# Patient Record
Sex: FEMALE | Race: WHITE | NOT HISPANIC OR LATINO | URBAN - METROPOLITAN AREA
[De-identification: names, ages, dates, MRNs, and addresses within clinical notes are randomized per-mention and may not be internally consistent; named-entity substitution may affect disease eponyms.]

---

## 2019-01-04 ENCOUNTER — OFFICE VISIT - HEALTHEAST (OUTPATIENT)
Dept: FAMILY MEDICINE | Facility: CLINIC | Age: 64
End: 2019-01-04

## 2019-01-04 DIAGNOSIS — M25.511 ACUTE PAIN OF RIGHT SHOULDER: ICD-10-CM

## 2019-01-08 ENCOUNTER — COMMUNICATION - HEALTHEAST (OUTPATIENT)
Dept: ADMINISTRATIVE | Facility: CLINIC | Age: 64
End: 2019-01-08

## 2019-04-03 ENCOUNTER — RECORDS - HEALTHEAST (OUTPATIENT)
Dept: MAMMOGRAPHY | Facility: CLINIC | Age: 64
End: 2019-04-03

## 2019-04-03 DIAGNOSIS — Z12.31 ENCOUNTER FOR SCREENING MAMMOGRAM FOR MALIGNANT NEOPLASM OF BREAST: ICD-10-CM

## 2019-04-04 ENCOUNTER — OFFICE VISIT - HEALTHEAST (OUTPATIENT)
Dept: FAMILY MEDICINE | Facility: CLINIC | Age: 64
End: 2019-04-04

## 2019-04-04 DIAGNOSIS — Z00.00 ROUTINE GENERAL MEDICAL EXAMINATION AT A HEALTH CARE FACILITY: ICD-10-CM

## 2019-04-04 ASSESSMENT — MIFFLIN-ST. JEOR: SCORE: 1322.91

## 2019-04-05 ENCOUNTER — COMMUNICATION - HEALTHEAST (OUTPATIENT)
Dept: FAMILY MEDICINE | Facility: CLINIC | Age: 64
End: 2019-04-05

## 2019-04-05 LAB
ANION GAP SERPL CALCULATED.3IONS-SCNC: 12 MMOL/L (ref 5–18)
BUN SERPL-MCNC: 14 MG/DL (ref 8–22)
CALCIUM SERPL-MCNC: 9.9 MG/DL (ref 8.5–10.5)
CHLORIDE BLD-SCNC: 103 MMOL/L (ref 98–107)
CHOLEST SERPL-MCNC: 190 MG/DL
CO2 SERPL-SCNC: 24 MMOL/L (ref 22–31)
CREAT SERPL-MCNC: 0.72 MG/DL (ref 0.6–1.1)
FASTING STATUS PATIENT QL REPORTED: YES
GFR SERPL CREATININE-BSD FRML MDRD: >60 ML/MIN/1.73M2
GLUCOSE BLD-MCNC: 81 MG/DL (ref 70–125)
HDLC SERPL-MCNC: 58 MG/DL
LDLC SERPL CALC-MCNC: 107 MG/DL
POTASSIUM BLD-SCNC: 4.3 MMOL/L (ref 3.5–5)
SODIUM SERPL-SCNC: 139 MMOL/L (ref 136–145)
TRIGL SERPL-MCNC: 126 MG/DL

## 2019-05-10 ENCOUNTER — RECORDS - HEALTHEAST (OUTPATIENT)
Dept: MULTI SPECIALTY CLINIC | Facility: CLINIC | Age: 64
End: 2019-05-10

## 2019-11-08 ENCOUNTER — COMMUNICATION - HEALTHEAST (OUTPATIENT)
Dept: FAMILY MEDICINE | Facility: CLINIC | Age: 64
End: 2019-11-08

## 2021-05-01 ENCOUNTER — HEALTH MAINTENANCE LETTER (OUTPATIENT)
Age: 66
End: 2021-05-01

## 2021-05-26 ENCOUNTER — RECORDS - HEALTHEAST (OUTPATIENT)
Dept: ADMINISTRATIVE | Facility: CLINIC | Age: 66
End: 2021-05-26

## 2021-05-27 NOTE — PATIENT INSTRUCTIONS - HE
We will notify you of lab results.  Work on diet and exercise. We should recheck your blood pressure with your physical in one year.  Consider shingles vaccine.

## 2021-05-27 NOTE — PROGRESS NOTES
FEMALE PREVENTATIVE EXAM    Assessment and Plan:       Problem List Items Addressed This Visit     None      Visit Diagnoses     Routine general medical examination at a health care facility    -  Primary    Relevant Orders    Lipid [place] FASTING    Basic Metabolic Panel            Next follow up:  Return in about 1 year (around 4/4/2020) for Annual physical.    Immunization Review  Adult Imm Review: No immunizations due today    I discussed the following with the patient:   Adult Healthy Living: Importance of regular exercise  Healthy nutrition    I have had an Advance Directives discussion with the patient.    Subjective:   Chief Complaint: Ama Mahoney is an 63 y.o. female here for a preventative health visit.     HPI:  The patient has no concerns today.    Healthy Habits  Are you taking a daily aspirin? No, every 3 days  Do you typically exercising at least 40 min, 3-4 times per week?  NO  Do you usually eat at least 4 servings of fruit and vegetables a day, include whole grains and fiber and avoid regularly eating high fat foods? NO  Have you had an eye exam in the past two years? Yes  Do you see a dentist twice per year? Yes  Do you have any concerns regarding sleep? No    Safety Screen  If you own firearms, are they secured in a locked gun cabinet or with trigger locks? NO  Do you feel you are safe where you are living?: Yes (4/4/2019 11:11 AM)  Do you feel you are safe in your relationship(s)?: Yes (4/4/2019 11:11 AM)    Review of Systems   Constitutional: Negative for activity change, appetite change, fever and unexpected weight change.   HENT: Negative for congestion, hearing loss and sore throat.    Eyes: Negative for discharge and visual disturbance.   Respiratory: Negative for cough, shortness of breath and wheezing.    Cardiovascular: Negative for chest pain, palpitations and leg swelling.   Gastrointestinal: Negative for abdominal pain, blood in stool, constipation, diarrhea and vomiting.  "  Endocrine: Negative for polydipsia and polyuria.   Genitourinary: Negative for decreased urine volume, difficulty urinating, dysuria, frequency, hematuria and urgency.   Musculoskeletal: Negative for arthralgias, gait problem and myalgias.   Skin: Negative for rash.   Allergic/Immunologic: Negative for immunocompromised state.   Neurological: Negative for weakness.   Hematological: Does not bruise/bleed easily.   Psychiatric/Behavioral: Negative for dysphoric mood and sleep disturbance. The patient is not nervous/anxious.         History     Reviewed By Date/Time Sections Reviewed    Angelita Rocha MD 4/4/2019 11:29 AM Medical, Surgical    Angelita Rocha MD 4/4/2019 11:28 AM Surgical    Angelita Rocha MD 4/4/2019 11:26 AM Medical, Surgical, Family    Afua Boggs MA 4/4/2019 11:10 AM Tobacco, Alcohol, Drug Use, Sexual Activity            Objective:   Vital Signs:   Visit Vitals  /86 (Patient Site: Left Arm, Patient Position: Sitting, Cuff Size: Adult Regular)   Pulse 68   Ht 5' 6\" (1.676 m)   Wt 165 lb 9.6 oz (75.1 kg)   Breastfeeding? No   BMI 26.73 kg/m       Physical Exam   Constitutional: She is oriented to person, place, and time. She appears well-developed and well-nourished. No distress.   HENT:   Right Ear: Tympanic membrane and ear canal normal.   Left Ear: Tympanic membrane and ear canal normal.   Mouth/Throat: Oropharynx is clear and moist.   Eyes: Conjunctivae and EOM are normal. Pupils are equal, round, and reactive to light.   Neck: Normal range of motion. Neck supple. Carotid bruit is not present. No thyromegaly present.   Cardiovascular: Normal rate and regular rhythm.   No murmur heard.  Pulmonary/Chest: Effort normal and breath sounds normal. No respiratory distress. She has no decreased breath sounds. She has no wheezes. She has no rhonchi.   Breast exam refused. Patient reports recent normal exam.   Abdominal: Soft. Bowel sounds are normal. She exhibits no distension. There " is no hepatosplenomegaly. There is no tenderness. There is no rigidity, no rebound and no guarding. Hernia confirmed negative in the ventral area.   Lymphadenopathy:     She has no cervical adenopathy.   Neurological: She is alert and oriented to person, place, and time. She has normal strength. No cranial nerve deficit. Gait normal.   Reflex Scores:       Bicep reflexes are 2+ on the right side and 2+ on the left side.       Patellar reflexes are 2+ on the right side and 2+ on the left side.  Skin: No rash noted.   Psychiatric: She has a normal mood and affect. Her behavior is normal. Judgment and thought content normal.

## 2021-06-02 VITALS — BODY MASS INDEX: 26.61 KG/M2 | HEIGHT: 66 IN | WEIGHT: 165.6 LBS

## 2021-06-02 VITALS — WEIGHT: 169.3 LBS | BODY MASS INDEX: 29.06 KG/M2

## 2021-06-19 NOTE — LETTER
Letter by Angelita Rocha MD at      Author: Angelita Rocha MD Service: -- Author Type: --    Filed:  Encounter Date: 11/8/2019 Status: Signed         Ama Mahoney   [address] [place] [id number]      11/8/2019       Dear Ama,       In reviewing your records, we have determined a gap in your preventive services. Based on your age and health history, we recommend the follow service.     ? General Physical  ? Physical with a Pap Smear  ? Colon cancer screening  ? Mammogram  ? Immunization  ? Diabetic check  ? Blood pressure/cardiovascular check  ? Asthma check  ? Cholesterol test  ? Lab work  ? Med check      If you have had the service elsewhere, please contact us so we can update our records. Please let us know if you have transferred your care to another clinic.    Please call [phone] to schedule this appointment.    We believe that a strong preventive care program, including regular physicals and follow-up care is an important part of a healthy lifestyle and we are committed to helping you maintain your health.    Thank you for choosing us as your health care provider.    Sincerely,     [facility]

## 2021-06-19 NOTE — LETTER
Letter by Angelita Rocha MD at      Author: Angelita Rocha MD Service: -- Author Type: --    Filed:  Encounter Date: 4/5/2019 Status: (Other)         Ama Mahoney  [address] [place] [id number]       April 5, 2019         Dear Ms. Mahoney,    Below are the results from your recent visit:    Resulted Orders   Lipid [place] FASTING   Result Value Ref Range    Cholesterol 190 <=199 mg/dL    Triglycerides 126 <=149 mg/dL    HDL Cholesterol 58 >=50 mg/dL    LDL Calculated 107 <=129 mg/dL    Patient Fasting > 8hrs? Yes    Basic Metabolic Panel   Result Value Ref Range    Sodium 139 136 - 145 mmol/L    Potassium 4.3 3.5 - 5.0 mmol/L    Chloride 103 98 - 107 mmol/L    CO2 24 22 - 31 mmol/L    Anion Gap, Calculation 12 5 - 18 mmol/L    Glucose 81 70 - 125 mg/dL    Calcium 9.9 8.5 - 10.5 mg/dL    BUN 14 8 - 22 mg/dL    Creatinine 0.72 0.60 - 1.10 mg/dL    GFR MDRD Af Amer >60 >60 mL/min/1.73m2    GFR MDRD Non Af Amer >60 >60 mL/min/1.73m2    Narrative    Fasting Glucose reference range is 70-99 mg/dL per  American Diabetes Association (ADA) guidelines.       Your labs all look excellent.    Please call with questions or contact us using [facility].    Sincerely,  Electronically signed by Angelita Rocha MD

## 2021-06-20 ENCOUNTER — HEALTH MAINTENANCE LETTER (OUTPATIENT)
Age: 66
End: 2021-06-20

## 2021-06-22 NOTE — TELEPHONE ENCOUNTER
Ama stopped by to offer a sincere thank you for taking care of her and sending her an Rx when she was in pain.

## 2021-06-22 NOTE — PROGRESS NOTES
Subjective:      Patient ID: Ama Mahoney is a 63 y.o. female.    Chief Complaint:    Shoulder Pain    The pain is present in the right shoulder. This is a new problem. The current episode started yesterday. There has been no history of extremity trauma. The problem occurs constantly. The problem has been gradually worsening. The quality of the pain is described as aching. The pain is at a severity of 7/10. The pain is severe. Associated symptoms include a limited range of motion. Pertinent negatives include no fever, inability to bear weight, itching, joint locking, joint swelling, numbness, stiffness or tingling. The symptoms are aggravated by activity. She has tried nothing for the symptoms.         Past Medical History:   Diagnosis Date     Medical history non-contributory        Past Surgical History:   Procedure Laterality Date     HYSTERECTOMY         No family history on file.    Social History     Tobacco Use     Smoking status: Never Smoker     Smokeless tobacco: Never Used   Substance Use Topics     Alcohol use: No     Drug use: No       Review of Systems   Constitutional: Negative for fever.   Musculoskeletal: Positive for arthralgias and myalgias. Negative for back pain, gait problem, joint swelling, neck pain, neck stiffness and stiffness.   Skin: Negative for itching.   Neurological: Negative for tingling, weakness and numbness.   All other systems reviewed and are negative.      Objective:     /76 (Patient Site: Left Arm, Patient Position: Sitting, Cuff Size: Adult Large)   Pulse 86   Temp 98.6  F (37  C) (Oral)   Resp 20   Wt 169 lb 4.8 oz (76.8 kg)   SpO2 98%   BMI 29.06 kg/m      Physical Exam   Constitutional: She is oriented to person, place, and time. She appears well-developed and well-nourished. No distress.   Musculoskeletal:        Right shoulder: She exhibits decreased range of motion, tenderness, pain and spasm. She exhibits no bony tenderness, no swelling, no effusion, no  crepitus, no deformity, no laceration, normal pulse and normal strength.   Neurological: She is alert and oriented to person, place, and time. She has normal strength. No sensory deficit.   Skin: Skin is warm and dry. No rash noted. No erythema.   Psychiatric: She has a normal mood and affect. Her behavior is normal. Judgment and thought content normal.   Nursing note and vitals reviewed.      Assessment:     Right Frozen shoulder    Plan:     1.  Patient was prescribed cyclobenzaprine 10 mg every 6-8 hours as needed muscle spasm #30  2.  I recommended taking ibuprofen as needed for the pain  3.  I recommended physical therapy if the problem was not improving spontaneously.  4.  It is noted that the patient left the clinic prior to fish finishing up the visit.  I did call her and discuss my impressions and my treatment recommendations

## 2021-10-11 ENCOUNTER — HEALTH MAINTENANCE LETTER (OUTPATIENT)
Age: 66
End: 2021-10-11

## 2022-05-22 ENCOUNTER — HEALTH MAINTENANCE LETTER (OUTPATIENT)
Age: 67
End: 2022-05-22

## 2022-09-25 ENCOUNTER — HEALTH MAINTENANCE LETTER (OUTPATIENT)
Age: 67
End: 2022-09-25

## 2023-05-13 ENCOUNTER — HEALTH MAINTENANCE LETTER (OUTPATIENT)
Age: 68
End: 2023-05-13

## 2023-08-05 ENCOUNTER — HEALTH MAINTENANCE LETTER (OUTPATIENT)
Age: 68
End: 2023-08-05